# Patient Record
(demographics unavailable — no encounter records)

---

## 2025-06-13 NOTE — HISTORY OF PRESENT ILLNESS
[de-identified] : This is very nice 56 year-old woman experiencing bilateral knee pain, which is severe in intensity. She has known left knee osteoarthritis. The right knee is new. The pain substantially limits activities of daily living. Walking tolerance is reduced. Medication including intraarticular cortisone injections and mobic and activity modification have been minimally effective for a period lasting greater than three months in duration. Assistive devices and external support were not deemed by the patient to be helpful in improving their function. Due to the severity of osteoarthritis and level of pain, physical therapy is contraindicated. Pain and restriction of function are intolerable at this time. Previously discussed L TKA but became very sick after a cruise and now healthy again.

## 2025-06-13 NOTE — PHYSICAL EXAM
[de-identified] : Patient is well nourished, well-developed, in no acute distress, with appropriate mood and affect. The patient is oriented to time, place, and person. Gait evaluation does reveal a limp. There is no inguinal adenopathy. The bilateral limbs are well-perfused, without skin lesions, shows a grossly normal motor and sensory examination. Knee motion is significantly reduced and does cause significant pain. The left knee moves from 0-110 degrees. The knee is stable within that range-of-motion to AP and ML stress. The alignment of the knee is 5 deg varus. Muscle strength is normal. Pedal pulses are palpable. Hip examination was negative.  [de-identified] : Long standing knee, AP knee, lateral knee, and patellar views of the bilateral knee were ordered and taken in the office and demonstrate degenerative joint disease of the knee with joint space narrowing, osteophyte formation, and subchondral sclerosis.